# Patient Record
Sex: FEMALE | Race: WHITE | NOT HISPANIC OR LATINO | Employment: STUDENT | URBAN - METROPOLITAN AREA
[De-identification: names, ages, dates, MRNs, and addresses within clinical notes are randomized per-mention and may not be internally consistent; named-entity substitution may affect disease eponyms.]

---
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
## 2024-05-24 ENCOUNTER — HOSPITAL ENCOUNTER (EMERGENCY)
Facility: HOSPITAL | Age: 19
Discharge: HOME | End: 2024-05-24
Attending: EMERGENCY MEDICINE
Payer: COMMERCIAL

## 2024-05-24 VITALS
RESPIRATION RATE: 16 BRPM | HEIGHT: 59 IN | TEMPERATURE: 98 F | OXYGEN SATURATION: 98 % | HEART RATE: 96 BPM | WEIGHT: 119 LBS | BODY MASS INDEX: 23.99 KG/M2 | SYSTOLIC BLOOD PRESSURE: 128 MMHG | DIASTOLIC BLOOD PRESSURE: 66 MMHG

## 2024-05-24 DIAGNOSIS — T40.715A ADVERSE REACTION TO CANNABIS, INITIAL ENCOUNTER: Primary | ICD-10-CM

## 2024-05-24 DIAGNOSIS — R00.0 TACHYCARDIA: ICD-10-CM

## 2024-05-24 DIAGNOSIS — Z79.899 REBOUND EFFECT OF MEDICATION: ICD-10-CM

## 2024-05-24 LAB
OHS QRS DURATION: 82 MS
OHS QTC CALCULATION: 421 MS

## 2024-05-24 PROCEDURE — 96372 THER/PROPH/DIAG INJ SC/IM: CPT | Performed by: EMERGENCY MEDICINE

## 2024-05-24 PROCEDURE — 99284 EMERGENCY DEPT VISIT MOD MDM: CPT | Mod: 25

## 2024-05-24 PROCEDURE — 25000003 PHARM REV CODE 250: Performed by: EMERGENCY MEDICINE

## 2024-05-24 PROCEDURE — 63600175 PHARM REV CODE 636 W HCPCS: Mod: UD | Performed by: EMERGENCY MEDICINE

## 2024-05-24 PROCEDURE — 93010 ELECTROCARDIOGRAM REPORT: CPT | Mod: ,,, | Performed by: INTERNAL MEDICINE

## 2024-05-24 PROCEDURE — 93005 ELECTROCARDIOGRAM TRACING: CPT

## 2024-05-24 RX ORDER — LORAZEPAM 2 MG/ML
1 INJECTION INTRAMUSCULAR
Status: COMPLETED | OUTPATIENT
Start: 2024-05-24 | End: 2024-05-24

## 2024-05-24 RX ORDER — ONDANSETRON 4 MG/1
4 TABLET, ORALLY DISINTEGRATING ORAL
Status: COMPLETED | OUTPATIENT
Start: 2024-05-24 | End: 2024-05-24

## 2024-05-24 RX ORDER — PROMETHAZINE HYDROCHLORIDE 12.5 MG/1
25 TABLET ORAL
Status: COMPLETED | OUTPATIENT
Start: 2024-05-24 | End: 2024-05-24

## 2024-05-24 RX ADMIN — PROMETHAZINE HYDROCHLORIDE 25 MG: 12.5 TABLET ORAL at 07:05

## 2024-05-24 RX ADMIN — ONDANSETRON 4 MG: 4 TABLET, ORALLY DISINTEGRATING ORAL at 06:05

## 2024-05-24 RX ADMIN — LORAZEPAM 1 MG: 2 INJECTION INTRAMUSCULAR; INTRAVENOUS at 06:05

## 2024-05-24 NOTE — ED PROVIDER NOTES
History     Chief Complaint   Patient presents with    Medication Reaction     Patient with c/o dizziness, nausea and anxiety after eating 1/4 CBD gummy; Patient states she ate about 0300, but symptoms persist     HPI:  Alyssa Murillo is a 19 y.o. female with PMH as below who presents to the [facility] emergency department for evaluation of dizziness, nausea, palpitations, anxiety at home after missing her nightly doxepin for 2 nights and taking CBD gummy for the first time around 3AM. She has no other complaints.       An independent history was also taken from the patient's mother due to the patient's age, who added: patient has had stomach issues and sleep issues.      PCP: Sylvia Bright CPNP    Review of patient's allergies indicates:   Allergen Reactions    Fructose Nausea Only    Lactose Nausea And Vomiting    Nisoldipine Rash    Sulfa (sulfonamide antibiotics) Rash      Past Medical History:   Diagnosis Date    Anxiety and depression     GERD (gastroesophageal reflux disease)     History of placement of ear tubes     PTSD (post-traumatic stress disorder)     Syncope and collapse      Past Surgical History:   Procedure Laterality Date    DIAGNOSTIC LAPAROSCOPY N/A 2022    Procedure: LAPAROSCOPY, DIAGNOSTIC;  Surgeon: Neftaly Whitley MD;  Location: [facility] OR;  Service: OB/GYN;  Laterality: N/A;    TONSILLECTOMY, ADENOIDECTOMY         Family History   Problem Relation Name Age of Onset    Arrhythmia Maternal Grandmother      Breast cancer Maternal Grandmother      Leukemia Maternal Grandmother      Lung cancer Maternal Grandfather      Mitral valve prolapse Paternal Grandmother      Heart attack Paternal Grandfather      Heart disease Paternal Grandfather      No Known Problems Mother      No Known Problems Father      Aplastic anemia Maternal Aunt      Anuerysm Other          maternal great aunt  at 46    Sudden death Other          cousin  at age 45 y/o    Stroke Other           maternal great aunt.    SIDS Other          cousin  at 5-6 months old    Other Unknown          father with history of loss of consciousness/syncope in the past.     Social History     Tobacco Use    Smoking status: Never     Passive exposure: Yes    Smokeless tobacco: Never    Tobacco comments:     mother vapes   Substance and Sexual Activity    Alcohol use: No     Alcohol/week: 0.0 standard drinks of alcohol    Drug use: No    Sexual activity: Never      Review of Systems     Review of Systems   Constitutional: Negative.    HENT: Negative.     Eyes: Negative.    Respiratory: Negative.     Cardiovascular:  Positive for palpitations.   Gastrointestinal:  Positive for abdominal pain (mild chronic, on medication).   Endocrine: Negative.    Genitourinary: Negative.    Musculoskeletal: Negative.    Skin: Negative.    Allergic/Immunologic: Negative.    Neurological: Negative.    Hematological: Negative.    Psychiatric/Behavioral: Negative.     All other systems reviewed and are negative.       Physical Exam     Initial Vitals [24 0621]   BP Pulse Resp Temp SpO2   123/72 (!) 122 20 98.4 °F (36.9 °C) 98 %      MAP       --          Nursing notes and vital signs reviewed.  Constitutional: Patient is in mild acute distress.   Head: Normocephalic. Atraumatic.   Eyes:  Conjunctivae are not pale. No scleral icterus.   ENT: Mucous membranes moist.   Neck: Supple.   Cardiovascular: Tachycardic.  Regular rhythm. No murmurs, rubs, or gallops    Pulmonary: No respiratory distress. Clear to auscultation bilaterally   Abdominal: Soft. Non-distended. Nontender.   Musculoskeletal: Moves all extremities. No obvious deformities.   Skin: Warm and dry.   Neurological:  Alert, awake, and appropriate. Normal speech. No acute lateralizing neurologic deficits appreciated.   Psychiatric: Normal affect.  Anxious.      ED Course   Procedures  Vitals:    24 0621 24 0726   BP: 123/72 131/64   Pulse: (!) 122 98   Resp: 20   "  Temp: 98.4 °F (36.9 °C)    TempSrc: Oral    SpO2: 98% 99%   Weight: 54 kg (119 lb)    Height: 4' 11" (1.499 m)      Lab Results Interpreted as Abnormal:  Labs Reviewed - No data to display     All Lab Results:  Results for orders placed or performed during the hospital encounter of 05/24/24   EKG 12-lead   Result Value Ref Range    QRS Duration 82 ms    OHS QTC Calculation 421 ms     Imaging Results    None        The EKG was ordered, reviewed, and independently interpreted by the ED Physician:  Rhythm: normal sinus  Rate: 98 bpm  No ST-T changes concerning for acute ischemia  Normal axis   Normal intervals     The emergency physician reviewed the vital signs and test results, which are outlined above.     ED Discussion      Patient's evaluation in the ED does not suggest any emergent or life-threatening medical conditions requiring immediate intervention beyond what was provided in the ED, and I believe patient is safe for discharge. Regardless, an unremarkable evaluation in the ED does not preclude the development or presence of a serious or life-threatening condition. As such, patient was given return instructions for any change or worsening of symptoms.       ED Medication(s) Administered:  Medications   promethazine tablet 25 mg (has no administration in time range)   LORazepam injection 1 mg (1 mg Intramuscular Given 5/24/24 0650)   ondansetron disintegrating tablet 4 mg (4 mg Oral Given 5/24/24 0650)       Prescription Management: I performed a review of the patient's current Rx medication list as input by nursing staff.    Patient's Medications   New Prescriptions    No medications on file   Previous Medications    DICYCLOMINE (BENTYL) 10 MG CAPSULE    TAKE 1 CAPSULE BY MOUTH 4 TIMES DAILY BEFORE MEAL(S) AND NIGHTLY    DICYCLOMINE (BENTYL) 20 MG TABLET        DOXEPIN (SINEQUAN) 25 MG CAPSULE    Take 25 mg by mouth every evening.    DULOXETINE (CYMBALTA) 30 MG CAPSULE    Take 30 mg by mouth every evening. "    ESOMEPRAZOLE (NEXIUM) 40 MG CAPSULE    Take 40 mg by mouth once daily.    FLUOXETINE 20 MG CAPSULE        LEUPROLIDE (LUPRON DEPOT) 3.75 MG INJECTION    Inject 3.75 mg into the muscle every 28 days. X 1 dose, Followed by 11.25 mg.    LEUPROLIDE (LUPRON DEPOT, 3 MONTH,) 11.25 MG INJECTION    Inject 11.25 mg into the muscle every 3 (three) months. X 2 doses.    NORETHINDRONE (AYGESTIN) 5 MG TAB    Take 1 tablet (5 mg total) by mouth once daily.   Modified Medications    No medications on file   Discontinued Medications    No medications on file         Follow-up Information       Schedule an appointment as soon as possible for a visit  with Sylvia Bright CPNP.    Specialty: Pediatrics  Contact information:  [address] [facility] [address] [phone]               [facility] Emergency Dept.    Specialty: Emergency Medicine  Why: As needed, If symptoms worsen  Contact information:  [address] [facility] [address] [phone]                          Clinical Impression       ICD-10-CM ICD-9-CM   1. Adverse reaction to cannabis, initial encounter  T40.715A E939.6   2. Tachycardia  R00.0 785.0   3. Rebound effect of medication  Z79.899 V58.69      ED Disposition Condition    Discharge Stable             Rubio Ceballos MD  05/24/24 2862

## 2025-05-09 ENCOUNTER — HOSPITAL ENCOUNTER (EMERGENCY)
Facility: HOSPITAL | Age: 20
Discharge: HOME | End: 2025-05-09
Attending: EMERGENCY MEDICINE
Payer: COMMERCIAL

## 2025-05-09 VITALS
RESPIRATION RATE: 14 BRPM | HEIGHT: 59 IN | HEART RATE: 72 BPM | SYSTOLIC BLOOD PRESSURE: 126 MMHG | OXYGEN SATURATION: 98 % | BODY MASS INDEX: 23.99 KG/M2 | WEIGHT: 119 LBS | TEMPERATURE: 98 F | DIASTOLIC BLOOD PRESSURE: 80 MMHG

## 2025-05-09 DIAGNOSIS — R10.2 PELVIC PAIN: Primary | ICD-10-CM

## 2025-05-09 LAB
ABSOLUTE EOSINOPHIL (OHS): 0.08 K/UL
ABSOLUTE MONOCYTE (OHS): 0.37 K/UL (ref 0.3–1)
ABSOLUTE NEUTROPHIL COUNT (OHS): 4.15 K/UL (ref 1.8–7.7)
ALBUMIN SERPL BCP-MCNC: 4.4 G/DL (ref 3.5–5.2)
ALP SERPL-CCNC: 57 UNIT/L (ref 40–150)
ALT SERPL W/O P-5'-P-CCNC: 10 UNIT/L (ref 10–44)
ANION GAP (OHS): 9 MMOL/L (ref 8–16)
AST SERPL-CCNC: 16 UNIT/L (ref 11–45)
B-HCG UR QL: NEGATIVE
BASOPHILS # BLD AUTO: 0.03 K/UL
BASOPHILS NFR BLD AUTO: 0.5 %
BILIRUB SERPL-MCNC: 0.7 MG/DL (ref 0.1–1)
BILIRUB UR QL STRIP.AUTO: ABNORMAL
BUN SERPL-MCNC: 9 MG/DL (ref 6–20)
CALCIUM SERPL-MCNC: 9.5 MG/DL (ref 8.7–10.5)
CHLORIDE SERPL-SCNC: 107 MMOL/L (ref 95–110)
CLARITY UR: CLEAR
CO2 SERPL-SCNC: 23 MMOL/L (ref 23–29)
COLOR UR AUTO: YELLOW
CREAT SERPL-MCNC: 0.8 MG/DL (ref 0.5–1.4)
ERYTHROCYTE [DISTWIDTH] IN BLOOD BY AUTOMATED COUNT: 12.5 % (ref 11.5–14.5)
GFR SERPLBLD CREATININE-BSD FMLA CKD-EPI: >60 ML/MIN/1.73/M2
GLUCOSE SERPL-MCNC: 86 MG/DL (ref 70–110)
GLUCOSE UR QL STRIP: NEGATIVE
HCT VFR BLD AUTO: 42.4 % (ref 37–48.5)
HGB BLD-MCNC: 14.7 GM/DL (ref 12–16)
HGB UR QL STRIP: ABNORMAL
HOLD SPECIMEN: NORMAL
IMM GRANULOCYTES # BLD AUTO: 0.07 K/UL (ref 0–0.04)
IMM GRANULOCYTES NFR BLD AUTO: 1.1 % (ref 0–0.5)
KETONES UR QL STRIP: ABNORMAL
LEUKOCYTE ESTERASE UR QL STRIP: NEGATIVE
LIPASE SERPL-CCNC: 18 U/L (ref 4–60)
LYMPHOCYTES # BLD AUTO: 1.54 K/UL (ref 1–4.8)
MCH RBC QN AUTO: 32 PG (ref 27–31)
MCHC RBC AUTO-ENTMCNC: 34.7 G/DL (ref 32–36)
MCV RBC AUTO: 92 FL (ref 82–98)
NITRITE UR QL STRIP: NEGATIVE
NUCLEATED RBC (/100WBC) (OHS): 0 /100 WBC
PH UR STRIP: 6 [PH]
PLATELET # BLD AUTO: 274 K/UL (ref 150–450)
PMV BLD AUTO: 8.9 FL (ref 9.2–12.9)
POTASSIUM SERPL-SCNC: 4.1 MMOL/L (ref 3.5–5.1)
PROT SERPL-MCNC: 6.9 GM/DL (ref 6–8.4)
PROT UR QL STRIP: ABNORMAL
RBC # BLD AUTO: 4.59 M/UL (ref 4–5.4)
RELATIVE EOSINOPHIL (OHS): 1.3 %
RELATIVE LYMPHOCYTE (OHS): 24.7 % (ref 18–48)
RELATIVE MONOCYTE (OHS): 5.9 % (ref 4–15)
RELATIVE NEUTROPHIL (OHS): 66.5 % (ref 38–73)
SODIUM SERPL-SCNC: 139 MMOL/L (ref 136–145)
SP GR UR STRIP: >=1.03
UROBILINOGEN UR STRIP-ACNC: ABNORMAL EU/DL
WBC # BLD AUTO: 6.24 K/UL (ref 3.9–12.7)

## 2025-05-09 PROCEDURE — 81025 URINE PREGNANCY TEST: CPT | Performed by: EMERGENCY MEDICINE

## 2025-05-09 PROCEDURE — 83690 ASSAY OF LIPASE: CPT | Performed by: PHYSICIAN ASSISTANT

## 2025-05-09 PROCEDURE — 76830 TRANSVAGINAL US NON-OB: CPT | Mod: 26,,, | Performed by: RADIOLOGY

## 2025-05-09 PROCEDURE — 99284 EMERGENCY DEPT VISIT MOD MDM: CPT | Mod: 25

## 2025-05-09 PROCEDURE — 76856 US EXAM PELVIC COMPLETE: CPT | Mod: TC

## 2025-05-09 PROCEDURE — 82247 BILIRUBIN TOTAL: CPT | Performed by: PHYSICIAN ASSISTANT

## 2025-05-09 PROCEDURE — 85025 COMPLETE CBC W/AUTO DIFF WBC: CPT | Performed by: PHYSICIAN ASSISTANT

## 2025-05-09 PROCEDURE — 76856 US EXAM PELVIC COMPLETE: CPT | Mod: 26,,, | Performed by: RADIOLOGY

## 2025-05-09 PROCEDURE — 81003 URINALYSIS AUTO W/O SCOPE: CPT | Performed by: PHYSICIAN ASSISTANT

## 2025-05-09 RX ORDER — IBUPROFEN 800 MG/1
800 TABLET, FILM COATED ORAL EVERY 6 HOURS PRN
Qty: 20 TABLET | Refills: 0 | Status: SHIPPED | OUTPATIENT
Start: 2025-05-09

## 2025-05-09 NOTE — FIRST PROVIDER EVALUATION
Emergency Department TeleTriage Encounter Note      CHIEF COMPLAINT    Chief Complaint   Patient presents with    Abdominal Pain     Patient reports lower abdominal pain present with nausea, vomiting, and occasional diarrhea for the last 7 days.  Reports history of endometriosis.  Denies difficulty urinating.       VITAL SIGNS   Initial Vitals [05/09/25 1341]   BP Pulse Resp Temp SpO2   116/82 75 16 98 °F (36.7 °C) 99 %      MAP       --            ALLERGIES    Review of patient's allergies indicates:   Allergen Reactions    Fructose Nausea Only    Lactose Nausea And Vomiting    Nisoldipine Rash    Sulfa (sulfonamide antibiotics) Rash       PROVIDER TRIAGE NOTE  Patient presents with complaint of N/V that started two weeks ago. Reports constipation. Reports no urinary symptoms. No vaginal bleeding or discharge.      Phy:   Constitutional: well nourished, well developed, appearing stated age, NAD        Initial orders will be placed and care will be transferred to an alternate provider when patient is roomed for a full evaluation. Any additional orders and the final disposition will be determined by that provider.        ORDERS  Labs Reviewed - No data to display    ED Orders (720h ago, onward)      None              Virtual Visit Note: The provider triage portion of this emergency department evaluation and documentation was performed via [employer], a HIPAA-compliant telemedicine application, in concert with a tele-presenter in the room. A face to face patient evaluation with one of my colleagues will occur once the patient is placed in an emergency department room.      DISCLAIMER: This note was prepared with [email or web address] voice recognition transcription software. Garbled syntax, mangled pronouns, and other bizarre constructions may be attributed to that software system.

## 2025-05-09 NOTE — ED NOTES
See triage notes.     Pain:  Rated 2/10.     Psychosocial:  Patient is calm and cooperative.  Patients insight and judgement are appropriate to situation.  Appears clean, well maintained, with clothing appropriate to environment.  No evidence of delusions, hallucinations, or psychosis.     Neuro:  Eyes open spontaneously.  Awake, alert, oriented x 4.  Speech clear and appropriate.  Tolerating saliva secretions well.  Able to follow commands, demonstrating ability to actively and appropriately communicate within context of current conversation.  Symmetrical facial muscles.  Moving all extremities well with no noted weakness.  Adequate muscle tone present.  Movement is purposeful.         Airway:  Bilateral chest rise and fall.  RR regular and non-labored.      Circulatory:  Skin warm, dry, and pink.  Capillary refill/skin blanching less than 3 seconds to distal of upper extremities.     Abdomen:  Abdomen soft and non-distended.         Urinary:  Patient denies pain, frequency, or urgency.  Voids independently.       Extremities:  No redness, heat, swelling, deformity, or pain.     Skin:  Intact with no bruising/discolorations noted.

## 2025-05-09 NOTE — ED PROVIDER NOTES
CHIEF COMPLAINT  Chief Complaint   Patient presents with    Abdominal Pain     Patient reports lower abdominal pain present with nausea, vomiting, and occasional diarrhea for the last 7 days.  Reports history of endometriosis.  Denies difficulty urinating.       HISTORY OF PRESENT ILLNESS  Alyssa Murillo is a 20 y.o. female with PMH as below, endometriosis who presents to ER for evaluation of intermittent pelvic pain for the past 7 days. She states she was diagnosed with endometriosis 4 years ago, she was on orilissa for 2 years, stopped taking it 2 years ago due to insurance issue, has tried different medication including oral contraceptive, followed by DR. Whitley, . She states she had ruptured ovarian cysts on her last visit. She denies abnormal vaginal discharge or dysuria. No other specific aggravating or relieving factors otherwise.      PAST MEDICAL HISTORY  Past Medical History:   Diagnosis Date    Anxiety and depression     GERD (gastroesophageal reflux disease)     History of placement of ear tubes     PTSD (post-traumatic stress disorder)     Syncope and collapse        CURRENT MEDICATIONS    Current Medications[1]    ALLERGIES    Review of patient's allergies indicates:   Allergen Reactions    Fructose Nausea Only    Lactose Nausea And Vomiting    Nisoldipine Rash    Sulfa (sulfonamide antibiotics) Rash       SURGICAL HISTORY    Past Surgical History:   Procedure Laterality Date    DIAGNOSTIC LAPAROSCOPY N/A 1/19/2022    Procedure: LAPAROSCOPY, DIAGNOSTIC;  Surgeon: Neftaly Whitley MD;  Location: [facility] OR;  Service: OB/GYN;  Laterality: N/A;    TONSILLECTOMY, ADENOIDECTOMY         SOCIAL HISTORY    Social History     Socioeconomic History    Marital status: Single   Tobacco Use    Smoking status: Never     Passive exposure: Yes    Smokeless tobacco: Never    Tobacco comments:     mother vapes   Substance and Sexual Activity    Alcohol use: No     Alcohol/week: 0.0 standard drinks of alcohol     "Drug use: No    Sexual activity: Never   Social History Narrative    She is starting 7th grade in Fall .  She does not play sports.       FAMILY HISTORY    Family History   Problem Relation Name Age of Onset    Arrhythmia Maternal Grandmother      Breast cancer Maternal Grandmother      Leukemia Maternal Grandmother      Lung cancer Maternal Grandfather      Mitral valve prolapse Paternal Grandmother      Heart attack Paternal Grandfather      Heart disease Paternal Grandfather      No Known Problems Mother      No Known Problems Father      Aplastic anemia Maternal Aunt      Anuerysm Other          maternal great aunt  at 46    Sudden death Other          cousin  at age 43 y/o    Stroke Other          maternal great aunt.    SIDS Other          cousin  at 5-6 months old    Other Unknown          father with history of loss of consciousness/syncope in the past.       REVIEW OF SYSTEMS    Review of Systems   Gastrointestinal:  Positive for abdominal pain, nausea and vomiting.     All other systems reviewed and are negative    VITAL SIGNS:   /80 (BP Location: Left arm, Patient Position: Standing)   Pulse 72   Temp 98 °F (36.7 °C) (Oral)   Resp 14   Ht 4' 11" (1.499 m)   Wt 54 kg (119 lb)   LMP 2025 (Approximate)   SpO2 98%   Breastfeeding No   BMI 24.04 kg/m²      Physical Exam  Constitutional:       Appearance: Normal appearance.   HENT:      Head: Normocephalic.   Cardiovascular:      Rate and Rhythm: Normal rate.   Pulmonary:      Effort: Pulmonary effort is normal. No respiratory distress.      Breath sounds: Normal breath sounds.   Abdominal:      Palpations: Abdomen is soft.      Tenderness: There is abdominal tenderness in the suprapubic area.   Musculoskeletal:         General: Normal range of motion.   Skin:     General: Skin is warm.      Capillary Refill: Capillary refill takes less than 2 seconds.   Neurological:      General: No focal deficit present.      Mental " Status: She is alert.      GCS: GCS eye subscore is 4. GCS verbal subscore is 5. GCS motor subscore is 6.   Psychiatric:         Attention and Perception: Attention normal.         Mood and Affect: Mood normal.         Speech: Speech normal.       Vitals and nursing note reviewed.     LABS    Labs Reviewed   URINALYSIS, REFLEX TO URINE CULTURE - Abnormal       Result Value    Color, UA Yellow      Appearance, UA Clear      pH, UA 6.0      Spec Grav UA >=1.030 (*)     Protein, UA Trace (*)     Glucose, UA Negative      Ketones, UA 3+ (*)     Bilirubin, UA 1+ (*)     Blood, UA Trace (*)     Nitrites, UA Negative      Urobilinogen, UA 2.0-3.0 (*)     Leukocyte Esterase, UA Negative     CBC WITH DIFFERENTIAL - Abnormal    WBC 6.24      RBC 4.59      HGB 14.7      HCT 42.4      MCV 92      MCH 32.0 (*)     MCHC 34.7      RDW 12.5      Platelet Count 274      MPV 8.9 (*)     Nucleated RBC 0      Neut % 66.5      Lymph % 24.7      Mono % 5.9      Eos % 1.3      Basophil % 0.5      Imm Grans % 1.1 (*)     Neut # 4.15      Lymph # 1.54      Mono # 0.37      Eos # 0.08      Baso # 0.03      Imm Grans # 0.07 (*)    COMPREHENSIVE METABOLIC PANEL - Normal    Sodium 139      Potassium 4.1      Chloride 107      CO2 23      Glucose 86      BUN 9      Creatinine 0.8      Calcium 9.5      Protein Total 6.9      Albumin 4.4      Bilirubin Total 0.7      ALP 57      AST 16      ALT 10      Anion Gap 9      eGFR >60     LIPASE - Normal    Lipase Level 18     PREGNANCY TEST, URINE RAPID - Normal    hCG Qualitative, Urine Negative     CBC W/ AUTO DIFFERENTIAL    Narrative:     The following orders were created for panel order CBC W/ AUTO DIFFERENTIAL.                  Procedure                               Abnormality         Status                                     ---------                               -----------         ------                                     CBC with Differential[[id number]]       Abnormal            Final  result                                                 Please view results for these tests on the individual orders.   GREY TOP URINE HOLD    Extra Tube Hold for add-ons.           EKG      RADIOLOGY    US Pelvis Comp with Transvag NON-OB (xpd   Final Result      Normal pelvic ultrasound.         Electronically signed by: Mary Mcadams   Date:    05/09/2025   Time:    16:04            PROCEDURES    Procedures    Medications - No data to display             Medical Decision Making  Alyssa Murillo is a 20 y.o. female with PMH as below, endometriosis who presents to ER for evaluation of intermittent pelvic pain for the past 7 days. She states she was diagnosed with endometriosis 4 years ago, she was on orilissa for 2 years, stopped taking it 2 years ago due to insurance issue, has tried different medication including oral contraceptive, followed by DR. Whitley, . She states she had ruptured ovarian cysts on her last visit. She denies abnormal vaginal discharge or dysuria. No other specific aggravating or relieving factors otherwise.    Ddx: turbo ovarian abscess, Vaginitis, Urinary frequency, Painful bladder syndrome,Pelvic inflammatory disease     Normal pelvic US  Labs unremarkable   Will treat pelvic with NSAID, she was advised to follow up with OBGYN    Problems Addressed:  Pelvic pain: acute illness or injury    Amount and/or Complexity of Data Reviewed  Labs: ordered. Decision-making details documented in ED Course.  Radiology: ordered. Decision-making details documented in ED Course.    Risk  Prescription drug management.           Discharge Medication List as of 5/9/2025  4:13 PM          Discharge Medication List as of 5/9/2025  4:13 PM        START taking these medications    Details   ibuprofen (ADVIL,MOTRIN) 800 MG tablet Take 1 tablet (800 mg total) by mouth every 6 (six) hours as needed for Pain., Starting Fri 5/9/2025, Normal             I discussed with patient and/or family/caretaker  that evaluation in the ED does not suggest any emergent or life threatening medical condition requiring immediate intervention beyond what was provided in the ED, and I believe the patient is safe for discharge.  Regardless, an unremarkable evaluation in the ED does not preclude the development or presence of a serious or life threatening condition. As such, patient was instructed to return immediately for any worsening or change in current symptoms  Patient agrees with plan of care.    DISPOSITION  Patient discharged to home in stable condition.        FINAL IMPRESSION    1. Pelvic pain             [1] No current facility-administered medications for this encounter.    Current Outpatient Medications:     dicyclomine (BENTYL) 20 mg tablet, Take 20 mg by mouth every evening., Disp: , Rfl:     doxepin (SINEQUAN) 25 MG capsule, Take 25 mg by mouth every evening., Disp: , Rfl:     FLUoxetine 20 MG capsule, , Disp: , Rfl:     ibuprofen (ADVIL,MOTRIN) 800 MG tablet, Take 1 tablet (800 mg total) by mouth every 6 (six) hours as needed for Pain., Disp: 20 tablet, Rfl: 0    L norgest/e.estradioL-e.estrad (CAMRESE LO) 0.1 mg-20 mcg (84)/10 mcg (7) 3MPk, Take 1 tablet by mouth once daily., Disp: 91 tablet, Rfl: 3    naproxen (NAPROSYN) 500 MG tablet, Take 1 tablet (500 mg total) by mouth 2 (two) times daily., Disp: 30 tablet, Rfl: 1       Zander Ordonez NP  05/09/25 0486

## 2025-05-09 NOTE — ED NOTES
Patient received for discharge.  Patient is awake, alert, and oriented x 4.  Speech clear and appropriate.  RR regular and non labored.  Skin W/D/P.  Given written and verbal discharge instruction, with verbal understanding.  Patient given the opportunity to ask questions, with answers to meet needs.  No complaints or noted concerns.  Ambulatory out of ED with steady gait.

## 2025-05-14 ENCOUNTER — HOSPITAL ENCOUNTER (EMERGENCY)
Facility: HOSPITAL | Age: 20
Discharge: HOME | End: 2025-05-15
Attending: EMERGENCY MEDICINE
Payer: COMMERCIAL

## 2025-05-14 DIAGNOSIS — R07.9 CHEST PAIN, UNSPECIFIED TYPE: Primary | ICD-10-CM

## 2025-05-14 DIAGNOSIS — R07.9 CHEST PAIN: ICD-10-CM

## 2025-05-14 LAB
ABSOLUTE EOSINOPHIL (OHS): 0.2 K/UL
ABSOLUTE MONOCYTE (OHS): 0.42 K/UL (ref 0.3–1)
ABSOLUTE NEUTROPHIL COUNT (OHS): 3.81 K/UL (ref 1.8–7.7)
ALBUMIN SERPL BCP-MCNC: 4.1 G/DL (ref 3.5–5.2)
ALP SERPL-CCNC: 53 UNIT/L (ref 40–150)
ALT SERPL W/O P-5'-P-CCNC: 8 UNIT/L (ref 10–44)
ANION GAP (OHS): 10 MMOL/L (ref 8–16)
AST SERPL-CCNC: 13 UNIT/L (ref 11–45)
BASOPHILS # BLD AUTO: 0.03 K/UL
BASOPHILS NFR BLD AUTO: 0.5 %
BILIRUB SERPL-MCNC: 0.5 MG/DL (ref 0.1–1)
BUN SERPL-MCNC: 10 MG/DL (ref 6–20)
CALCIUM SERPL-MCNC: 8.9 MG/DL (ref 8.7–10.5)
CHLORIDE SERPL-SCNC: 109 MMOL/L (ref 95–110)
CO2 SERPL-SCNC: 21 MMOL/L (ref 23–29)
CREAT SERPL-MCNC: 0.8 MG/DL (ref 0.5–1.4)
D DIMER PPP IA.FEU-MCNC: <0.19 MG/L FEU
ERYTHROCYTE [DISTWIDTH] IN BLOOD BY AUTOMATED COUNT: 12.8 % (ref 11.5–14.5)
GFR SERPLBLD CREATININE-BSD FMLA CKD-EPI: >60 ML/MIN/1.73/M2
GLUCOSE SERPL-MCNC: 82 MG/DL (ref 70–110)
HCT VFR BLD AUTO: 40.7 % (ref 37–48.5)
HGB BLD-MCNC: 14 GM/DL (ref 12–16)
IMM GRANULOCYTES # BLD AUTO: 0.02 K/UL (ref 0–0.04)
IMM GRANULOCYTES NFR BLD AUTO: 0.3 % (ref 0–0.5)
LYMPHOCYTES # BLD AUTO: 2.12 K/UL (ref 1–4.8)
MCH RBC QN AUTO: 31.3 PG (ref 27–31)
MCHC RBC AUTO-ENTMCNC: 34.4 G/DL (ref 32–36)
MCV RBC AUTO: 91 FL (ref 82–98)
NUCLEATED RBC (/100WBC) (OHS): 0 /100 WBC
PLATELET # BLD AUTO: 227 K/UL (ref 150–450)
PMV BLD AUTO: 8.8 FL (ref 9.2–12.9)
POTASSIUM SERPL-SCNC: 3.7 MMOL/L (ref 3.5–5.1)
PROT SERPL-MCNC: 6.3 GM/DL (ref 6–8.4)
RBC # BLD AUTO: 4.47 M/UL (ref 4–5.4)
RELATIVE EOSINOPHIL (OHS): 3 %
RELATIVE LYMPHOCYTE (OHS): 32.1 % (ref 18–48)
RELATIVE MONOCYTE (OHS): 6.4 % (ref 4–15)
RELATIVE NEUTROPHIL (OHS): 57.7 % (ref 38–73)
SODIUM SERPL-SCNC: 140 MMOL/L (ref 136–145)
TROPONIN I SERPL DL<=0.01 NG/ML-MCNC: 0.01 NG/ML
WBC # BLD AUTO: 6.6 K/UL (ref 3.9–12.7)

## 2025-05-14 PROCEDURE — 71045 X-RAY EXAM CHEST 1 VIEW: CPT | Mod: 26,,, | Performed by: RADIOLOGY

## 2025-05-14 PROCEDURE — 80053 COMPREHEN METABOLIC PANEL: CPT | Performed by: EMERGENCY MEDICINE

## 2025-05-14 PROCEDURE — 99285 EMERGENCY DEPT VISIT HI MDM: CPT | Mod: 25

## 2025-05-14 PROCEDURE — 71045 X-RAY EXAM CHEST 1 VIEW: CPT | Mod: TC

## 2025-05-14 PROCEDURE — 84484 ASSAY OF TROPONIN QUANT: CPT | Performed by: EMERGENCY MEDICINE

## 2025-05-14 PROCEDURE — 93005 ELECTROCARDIOGRAM TRACING: CPT | Performed by: INTERNAL MEDICINE

## 2025-05-14 PROCEDURE — 93010 ELECTROCARDIOGRAM REPORT: CPT | Mod: ,,, | Performed by: INTERNAL MEDICINE

## 2025-05-14 PROCEDURE — 25000003 PHARM REV CODE 250: Performed by: EMERGENCY MEDICINE

## 2025-05-14 PROCEDURE — 85025 COMPLETE CBC W/AUTO DIFF WBC: CPT | Performed by: EMERGENCY MEDICINE

## 2025-05-14 PROCEDURE — 85379 FIBRIN DEGRADATION QUANT: CPT | Performed by: EMERGENCY MEDICINE

## 2025-05-14 RX ORDER — KETOROLAC TROMETHAMINE 10 MG/1
10 TABLET, FILM COATED ORAL
Status: COMPLETED | OUTPATIENT
Start: 2025-05-14 | End: 2025-05-14

## 2025-05-14 RX ADMIN — KETOROLAC TROMETHAMINE 10 MG: 10 TABLET, FILM COATED ORAL at 11:05

## 2025-05-15 VITALS
WEIGHT: 119 LBS | BODY MASS INDEX: 23.99 KG/M2 | RESPIRATION RATE: 20 BRPM | SYSTOLIC BLOOD PRESSURE: 126 MMHG | HEART RATE: 84 BPM | OXYGEN SATURATION: 99 % | HEIGHT: 59 IN | TEMPERATURE: 97 F | DIASTOLIC BLOOD PRESSURE: 86 MMHG

## 2025-05-15 LAB
OHS QRS DURATION: 92 MS
OHS QTC CALCULATION: 429 MS

## 2025-05-15 PROCEDURE — 96372 THER/PROPH/DIAG INJ SC/IM: CPT | Performed by: EMERGENCY MEDICINE

## 2025-05-15 PROCEDURE — 63600175 PHARM REV CODE 636 W HCPCS: Performed by: EMERGENCY MEDICINE

## 2025-05-15 RX ORDER — DIPHENHYDRAMINE HYDROCHLORIDE 50 MG/ML
50 INJECTION, SOLUTION INTRAMUSCULAR; INTRAVENOUS
Status: COMPLETED | OUTPATIENT
Start: 2025-05-15 | End: 2025-05-15

## 2025-05-15 RX ADMIN — DIPHENHYDRAMINE HYDROCHLORIDE 50 MG: 50 INJECTION INTRAMUSCULAR; INTRAVENOUS at 12:05

## 2025-05-15 NOTE — ED PROVIDER NOTES
Encounter Date: 2025       History     Chief Complaint   Patient presents with    Chest Pain     Pt c/o cp to the entire left side of chest and ribs x 1 week. Pt reports the pain is mostly to the left but sometimes in the center. Pt reports the pain goes into left shoulder, left arm and up into neck. Pt reports numbness off and on to the left foot.     Nausea     Pt reports chills in the past 15 minutes. Pt reports nausea with the cp x 1 week.      HPI  20-year-old female presents with left-sided chest pain.  Also reports associated nausea.  The pain radiates to the left shoulder.  This has been going on for about a week.  Patient uses a vape.  She will says it history of endometriosis.  She has never had a pneumothorax.    Review of patient's allergies indicates:   Allergen Reactions    Fructose Nausea Only    Lactose Nausea And Vomiting    Nisoldipine Rash    Sulfa (sulfonamide antibiotics) Rash     Past Medical History:   Diagnosis Date    Anxiety and depression     GERD (gastroesophageal reflux disease)     History of placement of ear tubes     PTSD (post-traumatic stress disorder)     Syncope and collapse      Past Surgical History:   Procedure Laterality Date    DIAGNOSTIC LAPAROSCOPY N/A 2022    Procedure: LAPAROSCOPY, DIAGNOSTIC;  Surgeon: Neftaly Whitley MD;  Location: [facility] OR;  Service: OB/GYN;  Laterality: N/A;    TONSILLECTOMY, ADENOIDECTOMY       Family History   Problem Relation Name Age of Onset    Arrhythmia Maternal Grandmother      Breast cancer Maternal Grandmother      Leukemia Maternal Grandmother      Lung cancer Maternal Grandfather      Mitral valve prolapse Paternal Grandmother      Heart attack Paternal Grandfather      Heart disease Paternal Grandfather      No Known Problems Mother      No Known Problems Father      Aplastic anemia Maternal Aunt      Anuerysm Other          maternal great aunt  at 46    Sudden death Other          cousin  at age 45 y/o    Stroke  Other          maternal great aunt.    SIDS Other          cousin  at 5-6 months old    Other Unknown          father with history of loss of consciousness/syncope in the past.     Social History[1]  Review of Systems   All other systems reviewed and are negative.      Physical Exam     Initial Vitals [25 2221]   BP Pulse Resp Temp SpO2   121/71 85 16 97.1 °F (36.2 °C) 100 %      MAP       --         Physical Exam    Nursing note and vitals reviewed.  Constitutional: She appears well-developed and well-nourished.   HENT:   Head: Normocephalic and atraumatic.   Eyes: Conjunctivae are normal.   Neck:   Normal range of motion.  Cardiovascular:  Normal rate and regular rhythm.           Symmetric pulses   Pulmonary/Chest: No respiratory distress.   Breath sounds clear, symmetric   Abdominal: She exhibits no distension.   Musculoskeletal:         General: Normal range of motion.      Cervical back: Normal range of motion.      Comments: No chest wall tenderness     Neurological: She is alert.   Skin: Skin is warm and dry.   Psychiatric: She has a normal mood and affect.         ED Course   Procedures  Labs Reviewed   COMPREHENSIVE METABOLIC PANEL - Abnormal       Result Value    Sodium 140      Potassium 3.7      Chloride 109      CO2 21 (*)     Glucose 82      BUN 10      Creatinine 0.8      Calcium 8.9      Protein Total 6.3      Albumin 4.1      Bilirubin Total 0.5      ALP 53      AST 13      ALT 8 (*)     Anion Gap 10      eGFR >60     CBC WITH DIFFERENTIAL - Abnormal    WBC 6.60      RBC 4.47      HGB 14.0      HCT 40.7      MCV 91      MCH 31.3 (*)     MCHC 34.4      RDW 12.8      Platelet Count 227      MPV 8.8 (*)     Nucleated RBC 0      Neut % 57.7      Lymph % 32.1      Mono % 6.4      Eos % 3.0      Basophil % 0.5      Imm Grans % 0.3      Neut # 3.81      Lymph # 2.12      Mono # 0.42      Eos # 0.20      Baso # 0.03      Imm Grans # 0.02     TROPONIN I - Normal    Troponin-I 0.011     D DIMER,  QUANTITATIVE - Normal    D-Dimer <0.19     CBC W/ AUTO DIFFERENTIAL    Narrative:     The following orders were created for panel order CBC auto differential.  Procedure                               Abnormality         Status                     ---------                               -----------         ------                     CBC with Differential[[id number]]       Abnormal            Final result                 Please view results for these tests on the individual orders.          Imaging Results              X-Ray Chest AP Portable (Final result)  Result time 05/14/25 23:25:07      Final result by Kurtis Hinson DO (05/14/25 23:25:07)                   Impression:      No acute abnormality.      Electronically signed by: Kurtis Hinson  Date:    05/14/2025  Time:    23:25               Narrative:    EXAMINATION:  XR CHEST AP PORTABLE    CLINICAL HISTORY:  Chest pain, unspecified    TECHNIQUE:  Single frontal view of the chest was performed.    COMPARISON:  01/14/2022.    FINDINGS:  The lungs are well expanded and clear. No focal opacities are seen. The pleural spaces are clear. The cardiac silhouette is unremarkable. The visualized osseous structures are unremarkable.                                       Medications   ketorolac tablet 10 mg (10 mg Oral Given 5/14/25 2320)   diphenhydrAMINE injection 50 mg (50 mg Intramuscular Given 5/15/25 0016)     Medical Decision Making  See ED course    Problems Addressed:  Chest pain: complicated acute illness or injury that poses a threat to life or bodily functions  Chest pain, unspecified type: complicated acute illness or injury that poses a threat to life or bodily functions    Amount and/or Complexity of Data Reviewed  External Data Reviewed: ECG.  Labs: ordered. Decision-making details documented in ED Course.  Radiology: ordered and independent interpretation performed. Decision-making details documented in ED Course.  ECG/medicine tests: ordered and  independent interpretation performed. Decision-making details documented in ED Course.    Risk  Prescription drug management.               ED Course as of 05/15/25 0049   Wed May 14, 2025   2235 BP: 121/71 [LA]   2235 Temp: 97.1 °F (36.2 °C) [LA]   2235 Pulse: 85 [LA]   2235 Resp: 16 [LA]   2235 SpO2: 100 % [LA]   2235 Pt c/o cp to the entire left side of chest and ribs x 1 week. Pt reports the pain is mostly to the left but sometimes in the center. Pt reports the pain goes into left shoulder, left arm and up into neck. Pt reports numbness off and on to the left foot.NauseaPt reports chills in the past 15 minutes. Pt reports nausea with the cp x 1 week.    [LA]   2236 EKG interpreted by me: Sinus rhythm, rate 80, normal VT QTC interval, no STEMI, no adverse change [LA]   2306 Chest x-ray interpreted by me: No acute findings [LA]   2307 Given Toradol for pain [LA]   2307 Patient on oral contraceptive, does not know which [LA]   2344 D-Dimer: <0.19 [LA]   2344 WBC: 6.60 [LA]   2344 Hemoglobin: 14.0 [LA]   2344 Hematocrit: 40.7 [LA]   2344 Platelet Count: 227 [LA]   2350 Creatinine: 0.8 [LA]   2357 Troponin I: 0.011 [LA]   2359 Reassuring workup.  Patient looks fine.  Respirations unlabored.  She has been discharged.  Instructed to use Tylenol and ibuprofen for symptoms.  She has an appointment with her doctor tomorrow.  She will return if condition worsens in the interim [LA]      ED Course User Index  [LA] Peter Cosme MD                         Clinical Impression:  Final diagnoses:  [R07.9] Chest pain  [R07.9] Chest pain, unspecified type (Primary)          ED Disposition Condition    Discharge Fair          ED Prescriptions    None       Follow-up Information       Follow up With Specialties Details Why Contact Info    Sylvia Bright CPNP Pediatrics Schedule an appointment as soon as possible for a visit   [address] [facility] [address] [phone]      [facility] Emergency Dept Emergency  Medicine  As needed, If symptoms worsen [address] [facility] [address] [phone]               [1]   Social History  Tobacco Use    Smoking status: Never     Passive exposure: Yes    Smokeless tobacco: Never    Tobacco comments:     mother vapes   Substance Use Topics    Alcohol use: No     Alcohol/week: 0.0 standard drinks of alcohol    Drug use: No        Peter Cosme MD  05/15/25 0049

## 2025-05-19 ENCOUNTER — HOSPITAL ENCOUNTER (OUTPATIENT)
Dept: RADIOLOGY | Facility: HOSPITAL | Age: 20
Discharge: HOME | End: 2025-05-19
Attending: PEDIATRICS
Payer: COMMERCIAL

## 2025-05-19 DIAGNOSIS — R20.0 ANESTHESIA OF SKIN: ICD-10-CM

## 2025-05-19 DIAGNOSIS — R20.0 ANESTHESIA OF SKIN: Primary | ICD-10-CM

## 2025-05-19 PROCEDURE — 73620 X-RAY EXAM OF FOOT: CPT | Mod: TC,LT

## 2025-05-19 PROCEDURE — 73620 X-RAY EXAM OF FOOT: CPT | Mod: 26,LT,, | Performed by: RADIOLOGY

## 2025-06-02 ENCOUNTER — HOSPITAL ENCOUNTER (EMERGENCY)
Facility: HOSPITAL | Age: 20
Discharge: HOME | End: 2025-06-02
Attending: EMERGENCY MEDICINE
Payer: COMMERCIAL

## 2025-06-02 VITALS
RESPIRATION RATE: 20 BRPM | HEIGHT: 59 IN | HEART RATE: 68 BPM | SYSTOLIC BLOOD PRESSURE: 130 MMHG | TEMPERATURE: 99 F | OXYGEN SATURATION: 99 % | WEIGHT: 119 LBS | BODY MASS INDEX: 23.99 KG/M2 | DIASTOLIC BLOOD PRESSURE: 62 MMHG

## 2025-06-02 DIAGNOSIS — K59.00 CONSTIPATION, UNSPECIFIED CONSTIPATION TYPE: Primary | ICD-10-CM

## 2025-06-02 DIAGNOSIS — R39.198 DIFFICULTY URINATING: ICD-10-CM

## 2025-06-02 LAB
ABSOLUTE EOSINOPHIL (OHS): 0.15 K/UL
ABSOLUTE MONOCYTE (OHS): 0.48 K/UL (ref 0.3–1)
ABSOLUTE NEUTROPHIL COUNT (OHS): 6.46 K/UL (ref 1.8–7.7)
ALBUMIN SERPL BCP-MCNC: 3.9 G/DL (ref 3.5–5.2)
ALP SERPL-CCNC: 62 UNIT/L (ref 40–150)
ALT SERPL W/O P-5'-P-CCNC: 9 UNIT/L (ref 10–44)
ANION GAP (OHS): 6 MMOL/L (ref 8–16)
AST SERPL-CCNC: 16 UNIT/L (ref 11–45)
B-HCG UR QL: NEGATIVE
BASOPHILS # BLD AUTO: 0.04 K/UL
BASOPHILS NFR BLD AUTO: 0.5 %
BILIRUB SERPL-MCNC: 0.5 MG/DL (ref 0.1–1)
BILIRUB UR QL STRIP.AUTO: NEGATIVE
BUN SERPL-MCNC: 9 MG/DL (ref 6–20)
CALCIUM SERPL-MCNC: 9.5 MG/DL (ref 8.7–10.5)
CHLORIDE SERPL-SCNC: 108 MMOL/L (ref 95–110)
CLARITY UR: CLEAR
CO2 SERPL-SCNC: 26 MMOL/L (ref 23–29)
COLOR UR AUTO: YELLOW
CREAT SERPL-MCNC: 0.8 MG/DL (ref 0.5–1.4)
ERYTHROCYTE [DISTWIDTH] IN BLOOD BY AUTOMATED COUNT: 12.8 % (ref 11.5–14.5)
GFR SERPLBLD CREATININE-BSD FMLA CKD-EPI: >60 ML/MIN/1.73/M2
GLUCOSE SERPL-MCNC: 87 MG/DL (ref 70–110)
GLUCOSE UR QL STRIP: NEGATIVE
HCT VFR BLD AUTO: 40.5 % (ref 37–48.5)
HGB BLD-MCNC: 14.1 GM/DL (ref 12–16)
HGB UR QL STRIP: ABNORMAL
HOLD SPECIMEN: NORMAL
IMM GRANULOCYTES # BLD AUTO: 0.03 K/UL (ref 0–0.04)
IMM GRANULOCYTES NFR BLD AUTO: 0.4 % (ref 0–0.5)
KETONES UR QL STRIP: NEGATIVE
LEUKOCYTE ESTERASE UR QL STRIP: NEGATIVE
LYMPHOCYTES # BLD AUTO: 1.38 K/UL (ref 1–4.8)
MCH RBC QN AUTO: 31.7 PG (ref 27–31)
MCHC RBC AUTO-ENTMCNC: 34.8 G/DL (ref 32–36)
MCV RBC AUTO: 91 FL (ref 82–98)
NITRITE UR QL STRIP: NEGATIVE
NUCLEATED RBC (/100WBC) (OHS): 0 /100 WBC
PH UR STRIP: 6 [PH]
PLATELET # BLD AUTO: 264 K/UL (ref 150–450)
PMV BLD AUTO: 8.7 FL (ref 9.2–12.9)
POTASSIUM SERPL-SCNC: 4.1 MMOL/L (ref 3.5–5.1)
PROT SERPL-MCNC: 6.1 GM/DL (ref 6–8.4)
PROT UR QL STRIP: NEGATIVE
RBC # BLD AUTO: 4.45 M/UL (ref 4–5.4)
RELATIVE EOSINOPHIL (OHS): 1.8 %
RELATIVE LYMPHOCYTE (OHS): 16.2 % (ref 18–48)
RELATIVE MONOCYTE (OHS): 5.6 % (ref 4–15)
RELATIVE NEUTROPHIL (OHS): 75.5 % (ref 38–73)
SODIUM SERPL-SCNC: 140 MMOL/L (ref 136–145)
SP GR UR STRIP: <=1.005
UROBILINOGEN UR STRIP-ACNC: NEGATIVE EU/DL
WBC # BLD AUTO: 8.54 K/UL (ref 3.9–12.7)

## 2025-06-02 PROCEDURE — 85025 COMPLETE CBC W/AUTO DIFF WBC: CPT | Performed by: PHYSICIAN ASSISTANT

## 2025-06-02 PROCEDURE — 74176 CT ABD & PELVIS W/O CONTRAST: CPT | Mod: 26,,, | Performed by: RADIOLOGY

## 2025-06-02 PROCEDURE — 99284 EMERGENCY DEPT VISIT MOD MDM: CPT | Mod: 25

## 2025-06-02 PROCEDURE — 74176 CT ABD & PELVIS W/O CONTRAST: CPT | Mod: TC

## 2025-06-02 PROCEDURE — 80053 COMPREHEN METABOLIC PANEL: CPT | Performed by: PHYSICIAN ASSISTANT

## 2025-06-02 PROCEDURE — 81025 URINE PREGNANCY TEST: CPT | Performed by: PHYSICIAN ASSISTANT

## 2025-06-02 PROCEDURE — 81003 URINALYSIS AUTO W/O SCOPE: CPT

## 2025-06-02 PROCEDURE — 94760 N-INVAS EAR/PLS OXIMETRY 1: CPT

## 2025-06-02 PROCEDURE — 51798 US URINE CAPACITY MEASURE: CPT

## 2025-06-02 PROCEDURE — 25000003 PHARM REV CODE 250: Performed by: EMERGENCY MEDICINE

## 2025-06-02 RX ADMIN — LACTULOSE 10 G: 20 SOLUTION ORAL at 09:06

## 2025-06-02 NOTE — FIRST PROVIDER EVALUATION
" Emergency Department TeleTriage Encounter Note      CHIEF COMPLAINT    Chief Complaint   Patient presents with    Dysuria     Pt. C/o dysuria beginning Friday and worsening today. Pt. C/o feeling urge to urinate but has difficulty urinating. Pt. States, "I feel like my bladder is full but it is hard to go.".        VITAL SIGNS   Initial Vitals [06/02/25 1740]   BP Pulse Resp Temp SpO2   123/78 91 18 98.5 °F (36.9 °C) 98 %      MAP       --            ALLERGIES    Review of patient's allergies indicates:   Allergen Reactions    Fructose Nausea Only    Lactose Nausea And Vomiting    Nisoldipine Rash    Sulfa (sulfonamide antibiotics) Rash       PROVIDER TRIAGE NOTE  This is a teletriage evaluation of a 20 y.o. female presenting to the ED complaining of dysuria. Patient reports urinary urgency but very little urine coming out.    Patient is alert and oriented. She speaks in complete sentences. She is sitting upright in the chair in no distress.     Initial orders will be placed and care will be transferred to an alternate provider when patient is roomed for a full evaluation. Any additional orders and the final disposition will be determined by that provider.         ORDERS  Labs Reviewed   URINALYSIS, REFLEX TO URINE CULTURE - Abnormal       Result Value    Color, UA Yellow      Appearance, UA Clear      pH, UA 6.0      Spec Grav UA <=1.005 (*)     Protein, UA Negative      Glucose, UA Negative      Ketones, UA Negative      Bilirubin, UA Negative      Blood, UA Trace (*)     Nitrites, UA Negative      Urobilinogen, UA Negative      Leukocyte Esterase, UA Negative     GREY TOP URINE HOLD   CBC W/ AUTO DIFFERENTIAL    Narrative:     The following orders were created for panel order Complete Blood Count (CBC).  Procedure                               Abnormality         Status                     ---------                               -----------         ------                     CBC with Differential[[id number]]     "                                                    Please view results for these tests on the individual orders.   COMPREHENSIVE METABOLIC PANEL   CBC WITH DIFFERENTIAL   PREGNANCY TEST, URINE RAPID       ED Orders (720h ago, onward)      Start Ordered     Status Ordering Provider    06/02/25 1827 06/02/25 1826  Bladder scan  Once        Comments: PRN reason: per post mesa removal protocol or symptoms of urinary retention including urge to void, abdominal fullness, or distention.    Notify MD for bladder volume >300 mL        Acknowledged ROSANIRAJ    06/02/25 1827 06/02/25 1826  Complete Blood Count (CBC)  STAT         Acknowledged ROSA, NIRAJ G.    06/02/25 1827 06/02/25 1826  Comprehensive Metabolic Panel (CMP)  STAT         Acknowledged ROSA NIRAJ GSadi    06/02/25 1827 06/02/25 1826  Insert Saline lock IV  Once         Acknowledged ROSANIRAJ    06/02/25 1827 06/02/25 1826  CBC with Differential  PROCEDURE ONCE         Ordered ROSANIRAJ    06/02/25 1827 06/02/25 1826  Pregnancy, urine rapid  Once         Acknowledged NIRAJ LEE    06/02/25 1819 06/02/25 1819  GREY TOP URINE HOLD  Once         In process LYUBOV KATHLEEN    06/02/25 1811 06/02/25 1810  Urinalysis, Reflex to Urine Culture Urine, Clean Catch  STAT         Final result LYUBOV KATHLEEN              Virtual Visit Note: The provider triage portion of this emergency department evaluation and documentation was performed via [employer], a HIPAA-compliant telemedicine application, in concert with a tele-presenter in the room. A face to face patient evaluation with one of my colleagues will occur once the patient is placed in an emergency department room.      DISCLAIMER: This note was prepared with [employer] voice recognition transcription software. Garbled syntax, mangled pronouns, and other bizarre constructions may be attributed to that software system.

## 2025-06-02 NOTE — ED NOTES
Pt examined by teletriage provider, straight stick performed, pt tolerated well and blood specimen collected for transport to the lab. Pt has no other needs or concerns at this time, Pt updated on plan of care and pt voiced understanding.

## 2025-06-03 NOTE — ED PROVIDER NOTES
"Encounter Date: 2025       History     Chief Complaint   Patient presents with    Dysuria     Pt. C/o dysuria beginning Friday and worsening today. Pt. C/o feeling urge to urinate but has difficulty urinating. Pt. States, "I feel like my bladder is full but it is hard to go.".      HPI  20-year-old female presenting with difficulty urinating.  For the past couple days feels like she is unable to empty her bladder.  Says she has been a lot of time in the bathroom today.  She thought it maybe related to constipation however bowel movement earlier and did not help with the urination issues.  No fevers chills flank pain.  Primarily complaining of suprapubic discomfort.  No pain in the low back.  No trauma of the low spine.  No history of retention or incontinence.  No perianal anesthesia    Review of patient's allergies indicates:   Allergen Reactions    Fructose Nausea Only    Lactose Nausea And Vomiting    Nisoldipine Rash    Sulfa (sulfonamide antibiotics) Rash     Past Medical History:   Diagnosis Date    Anxiety and depression     GERD (gastroesophageal reflux disease)     History of placement of ear tubes     PTSD (post-traumatic stress disorder)     Syncope and collapse      Past Surgical History:   Procedure Laterality Date    DIAGNOSTIC LAPAROSCOPY N/A 2022    Procedure: LAPAROSCOPY, DIAGNOSTIC;  Surgeon: Neftaly Whitley MD;  Location: [facility] OR;  Service: OB/GYN;  Laterality: N/A;    TONSILLECTOMY, ADENOIDECTOMY       Family History   Problem Relation Name Age of Onset    Arrhythmia Maternal Grandmother      Breast cancer Maternal Grandmother      Leukemia Maternal Grandmother      Lung cancer Maternal Grandfather      Mitral valve prolapse Paternal Grandmother      Heart attack Paternal Grandfather      Heart disease Paternal Grandfather      No Known Problems Mother      No Known Problems Father      Aplastic anemia Maternal Aunt      Anuerysm Other          maternal great aunt  at 46    " Sudden death Other          cousin  at age 45 y/o    Stroke Other          maternal great aunt.    SIDS Other          cousin  at 5-6 months old    Other Unknown          father with history of loss of consciousness/syncope in the past.     Social History[1]  Review of Systems   All other systems reviewed and are negative.      Physical Exam     Initial Vitals [25 1740]   BP Pulse Resp Temp SpO2   123/78 91 18 98.5 °F (36.9 °C) 98 %      MAP       --         Physical Exam    Nursing note and vitals reviewed.  Constitutional: She appears well-developed and well-nourished.   HENT:   Head: Normocephalic and atraumatic.   Eyes: Conjunctivae are normal.   Neck:   Normal range of motion.  Cardiovascular:  Normal rate and regular rhythm.           Pulmonary/Chest: No respiratory distress.   Abdominal: She exhibits no distension.   Mild suprapubic tenderness, no guarding, no tenderness anywhere else   Musculoskeletal:         General: Normal range of motion.      Cervical back: Normal range of motion.     Neurological: She is alert.   No weakness, ambulatory without assistance   Skin: Skin is warm and dry.   Psychiatric: She has a normal mood and affect.         ED Course   Procedures  Labs Reviewed   URINALYSIS, REFLEX TO URINE CULTURE - Abnormal       Result Value    Color, UA Yellow      Appearance, UA Clear      pH, UA 6.0      Spec Grav UA <=1.005 (*)     Protein, UA Negative      Glucose, UA Negative      Ketones, UA Negative      Bilirubin, UA Negative      Blood, UA Trace (*)     Nitrites, UA Negative      Urobilinogen, UA Negative      Leukocyte Esterase, UA Negative     COMPREHENSIVE METABOLIC PANEL - Abnormal    Sodium 140      Potassium 4.1      Chloride 108      CO2 26      Glucose 87      BUN 9      Creatinine 0.8      Calcium 9.5      Protein Total 6.1      Albumin 3.9      Bilirubin Total 0.5      ALP 62      AST 16      ALT 9 (*)     Anion Gap 6 (*)     eGFR >60     CBC WITH DIFFERENTIAL -  Abnormal    WBC 8.54      RBC 4.45      HGB 14.1      HCT 40.5      MCV 91      MCH 31.7 (*)     MCHC 34.8      RDW 12.8      Platelet Count 264      MPV 8.7 (*)     Nucleated RBC 0      Neut % 75.5 (*)     Lymph % 16.2 (*)     Mono % 5.6      Eos % 1.8      Basophil % 0.5      Imm Grans % 0.4      Neut # 6.46      Lymph # 1.38      Mono # 0.48      Eos # 0.15      Baso # 0.04      Imm Grans # 0.03     PREGNANCY TEST, URINE RAPID - Normal    hCG Qualitative, Urine Negative     GREY TOP URINE HOLD    Extra Tube Hold for add-ons.     CBC W/ AUTO DIFFERENTIAL    Narrative:     The following orders were created for panel order Complete Blood Count (CBC).  Procedure                               Abnormality         Status                     ---------                               -----------         ------                     CBC with Differential[[id number]]       Abnormal            Final result                 Please view results for these tests on the individual orders.          Imaging Results              CT Renal Stone Study ABD Pelvis WO (Final result)  Result time 06/02/25 21:03:24      Final result by Jorge Springer MD (06/02/25 21:03:24)                   Impression:      1. No acute abnormality.  2. Possible constipation.      Electronically signed by: Jorge Springer  Date:    06/02/2025  Time:    21:03               Narrative:    EXAMINATION:  CT RENAL STONE STUDY ABD PELVIS WO    CLINICAL HISTORY:  Flank pain, kidney stone suspected;    TECHNIQUE:  Low dose axial images, sagittal and coronal reformations were obtained from the lung bases to the pubic symphysis.  Contrast was not administered.    COMPARISON:  None    FINDINGS:  Heart: Normal in size. No pericardial effusion.    Lung Bases: Well aerated, without consolidation or pleural fluid.    Liver: Normal in size and attenuation, with no focal hepatic lesions.    Gallbladder: Status post cholecystectomy.    Bile Ducts: No evidence of dilated  ducts.    Pancreas: No mass or peripancreatic fat stranding.    Spleen: Unremarkable.    Adrenals: Unremarkable.    Kidneys/ Ureters: Unremarkable.    Bladder: No evidence of wall thickening.    Reproductive organs: Uterus is midline.  No adnexal mass.    The appendix is within normal limits.    Moderate retained feces in the colon.    GI Tract/Mesentery: No evidence of bowel obstruction or inflammation.    Peritoneal Space: No ascites. No free air.    Retroperitoneum: No significant adenopathy.    Abdominal wall: Unremarkable.    Vasculature: No significant atherosclerosis or aneurysm.    Bones: No acute fracture.                                       Medications   lactulose 20 gram/30 mL solution Soln 10 g (10 g Oral Given 6/2/25 2155)     Medical Decision Making  See ED course    Problems Addressed:  Constipation, unspecified constipation type: chronic illness or injury with exacerbation, progression, or side effects of treatment  Difficulty urinating: complicated acute illness or injury     Details: Patient's postvoid residual right at 100 mL.  I do not think this necessitates putting in a catheter.  No hydronephrosis,  No renal dysfunction    Amount and/or Complexity of Data Reviewed  Independent Historian: parent  External Data Reviewed: labs.  Labs: ordered. Decision-making details documented in ED Course.  Radiology: ordered. Decision-making details documented in ED Course.    Risk  Prescription drug management.               ED Course as of 06/03/25 0049   Mon Jun 02, 2025 1937 Spec Grav UA(!): <=1.005 [LA]   1938 Blood, UA(!): Trace [LA]   1938 hCG Qualitative, Urine: Negative [LA]   1938 WBC: 8.54 [LA]   1938 Hemoglobin: 14.1 [LA]   1938 Hematocrit: 40.5 [LA]   1938 Platelet Count: 264 [LA]   1938 eGFR: >60 [LA]   1938 Anion Gap(!): 6 [LA]   1938 ALT(!): 9 [LA]   1938 Creatinine: 0.8 [LA]   1938 Sodium: 140 [LA]   1938 BP: 123/78 [LA]   1938 Temp: 98.5 °F (36.9 °C) [LA]   1938 Pulse: 91 [LA]   1938  "Resp: 18 [LA]   1938  Pt. C/o dysuria beginning Friday and worsening today. Pt. C/o feeling urge to urinate but has difficulty urinating. Pt. States, "I feel like my bladder is full but it is hard to go.". [LA]   1951 Bladder scan [LA]   2129 CT AP  Impression:     1. No acute abnormality.  2. Possible constipation.   [LA]   2146 Has been having issues constipation.  We had 100 mL on bladder scan.  I do not think this warrants a Tolbert catheter.  We will be more aggressive with bowel regimen and discharge patient.  She will follow up primary care clinic. [LA]      ED Course User Index  [LA] Peter Cosme MD                           Clinical Impression:  Final diagnoses:  [K59.00] Constipation, unspecified constipation type (Primary)  [R39.198] Difficulty urinating          ED Disposition Condition    Discharge Fair          ED Prescriptions       Medication Sig Dispense Start Date End Date Auth. Provider    lactulose (CHRONULAC) 20 gram/30 mL Soln Take 15 mLs (10 g total) by mouth every 6 (six) hours as needed (constipation). 300 mL 6/2/2025 -- Peter Cosme MD          Follow-up Information       Follow up With Specialties Details Why Contact Info    Sylvia Bright CPNP Pediatrics Schedule an appointment as soon as possible for a visit   [address] [facility] [address] [phone]      [facility] Emergency Dept Emergency Medicine  As needed, If symptoms worsen [address] [facility] [address] [phone]                   [1]   Social History  Tobacco Use    Smoking status: Never     Passive exposure: Yes    Smokeless tobacco: Never    Tobacco comments:     mother vapes   Substance Use Topics    Alcohol use: No     Alcohol/week: 0.0 standard drinks of alcohol    Drug use: No        Peter Cosme MD  06/03/25 0050    "

## 2025-06-03 NOTE — ED NOTES
Patient presents with c/o lower pelvic pain- reports that she has had similar sx in the past- states that this episode started on Friday- intermittent episodes of being unable to get comfortable- patient reports that she has had urgency and sensation of feeling that her bladder is not fully emptied - patient reports that she has had no fever- denies being on her menstrual cycle at this time- denies any back pain- denies any radiation of the pain

## (undated) DEVICE — ADHESIVE DERMABOND ADVANCED

## (undated) DEVICE — KIT ANTIFOG

## (undated) DEVICE — SUT CTD VICRYL 4-0 BR PS-2

## (undated) DEVICE — SEE MEDLINE ITEM 157116

## (undated) DEVICE — ELECTRODE REM PLYHSV RETURN 9

## (undated) DEVICE — SPATULA CURVED 33CM HC BLACK

## (undated) DEVICE — PAD SANITARY OB STERILE

## (undated) DEVICE — NDL INSUF ULTRA VERESS 120MM

## (undated) DEVICE — Device

## (undated) DEVICE — DRESSING TELFA STRL 4X3 LF

## (undated) DEVICE — LEGGING CLEAR POLY 2/PACK

## (undated) DEVICE — BRIEF MESH LARGE

## (undated) DEVICE — TROCAR ENDO Z THREAD KII 5X100

## (undated) DEVICE — GLOVE SURG ULTRA TOUCH 7

## (undated) DEVICE — TUBING PNEUMO

## (undated) DEVICE — SOL CLEARIFY VISUALIZATION LAP

## (undated) DEVICE — FILTER LAPAROSCOPIC SMOKE EVAC

## (undated) DEVICE — SYR BULB IRRIG ST 60 LF

## (undated) DEVICE — SEE MEDLINE ITEM 156964

## (undated) DEVICE — SEE MEDLINE ITEM 157181

## (undated) DEVICE — DRAPE ABDOMINAL TIBURON 14X11

## (undated) DEVICE — CANNULA LAP SEAL Z THRD 5X100

## (undated) DEVICE — TRAY DRY SKIN SCRUB PREP

## (undated) DEVICE — SPONGE LAP 18X18 PREWASHED

## (undated) DEVICE — SCRUB HIBICLENS 4% CHG 4OZ

## (undated) DEVICE — CANISTER SUCTION 3000CC